# Patient Record
Sex: FEMALE | Race: WHITE | NOT HISPANIC OR LATINO | Employment: FULL TIME | URBAN - METROPOLITAN AREA
[De-identification: names, ages, dates, MRNs, and addresses within clinical notes are randomized per-mention and may not be internally consistent; named-entity substitution may affect disease eponyms.]

---

## 2022-03-07 ENCOUNTER — TELEPHONE (OUTPATIENT)
Dept: NEUROLOGY | Facility: CLINIC | Age: 52
End: 2022-03-07
Payer: COMMERCIAL

## 2022-03-07 NOTE — TELEPHONE ENCOUNTER
Contacted patient in regards to scheduling appt, pt was scheduled with  for 9/8/22. Has also been added to the waiting list.

## 2022-03-07 NOTE — TELEPHONE ENCOUNTER
----- Message from Re Bearden sent at 3/7/2022  3:54 PM CST -----  Contact: Kerrie  Type:  Sooner Apoointment Request    Caller is requesting a sooner appointment.  Caller declined first available appointment listed below.  Caller will not accept being placed on the waitlist and is requesting a message be sent to doctor.  Name of Caller: Kerrie  When is the first available appointment?6/2022  Symptoms: Headaches for a year and a half  Would the patient rather a call back or a response via [facility]? Call back  Best Call Back Number: [phone].  Additional Information:

## 2023-11-27 ENCOUNTER — TELEPHONE (OUTPATIENT)
Dept: PRIMARY CARE CLINIC | Facility: CLINIC | Age: 53
End: 2023-11-27
Payer: COMMERCIAL

## 2023-11-27 NOTE — TELEPHONE ENCOUNTER
----- Message from [place]ie [place] sent at 11/27/2023  2:34 PM CST -----  Contact: Kerrie  .Type:  Patient Returning Call    Who Called: Kerrie   Who Left Message for Patient:Dede Ramirez MA  Does the patient know what this is regarding?:  Would the patient rather a call back or a response via [facility]? call  Best Call Back Number:  [phone]  Additional Information: kerrie is returning the missed call from today and would like to speak to you today please.

## 2023-11-27 NOTE — TELEPHONE ENCOUNTER
Patient is aware that you are not accepting new patients. Turned down request for Dr. Elmore and states that she would like to see a female provider. Patient is wondering if you had any recommendations.

## 2023-11-27 NOTE — TELEPHONE ENCOUNTER
----- Message from Salvador Granados sent at 11/27/2023 12:01 PM CST -----  Type:  Patient Returning Call    Who Called:pt  Who Left Message for Patient:  Does the patient know what this is regarding?:appt- est care  Would the patient rather a call back or a response via [facility]? Call  Best Call Back Number:[phone]  Additional Information: pt states she is calling in regards to trying to est care with provider if possible. Thank you

## 2023-11-30 ENCOUNTER — OFFICE VISIT (OUTPATIENT)
Dept: INTERNAL MEDICINE | Facility: CLINIC | Age: 53
End: 2023-11-30
Payer: COMMERCIAL

## 2023-11-30 VITALS
SYSTOLIC BLOOD PRESSURE: 124 MMHG | HEART RATE: 88 BPM | TEMPERATURE: 97 F | OXYGEN SATURATION: 98 % | DIASTOLIC BLOOD PRESSURE: 86 MMHG | BODY MASS INDEX: 27.55 KG/M2 | HEIGHT: 64 IN | WEIGHT: 161.38 LBS

## 2023-11-30 DIAGNOSIS — R23.2 HOT FLASHES: Primary | ICD-10-CM

## 2023-11-30 PROCEDURE — 99203 OFFICE O/P NEW LOW 30 MIN: CPT | Mod: S$GLB,,, | Performed by: NURSE PRACTITIONER

## 2023-11-30 PROCEDURE — 1159F MED LIST DOCD IN RCRD: CPT | Mod: CPTII,S$GLB,, | Performed by: NURSE PRACTITIONER

## 2023-11-30 PROCEDURE — 99999 PR PBB SHADOW E&M-EST. PATIENT-LVL IV: ICD-10-PCS | Mod: PBBFAC,,, | Performed by: NURSE PRACTITIONER

## 2023-11-30 PROCEDURE — 99203 PR OFFICE/OUTPT VISIT, NEW, LEVL III, 30-44 MIN: ICD-10-PCS | Mod: S$GLB,,, | Performed by: NURSE PRACTITIONER

## 2023-11-30 PROCEDURE — 3074F SYST BP LT 130 MM HG: CPT | Mod: CPTII,S$GLB,, | Performed by: NURSE PRACTITIONER

## 2023-11-30 PROCEDURE — 3079F DIAST BP 80-89 MM HG: CPT | Mod: CPTII,S$GLB,, | Performed by: NURSE PRACTITIONER

## 2023-11-30 PROCEDURE — 1159F PR MEDICATION LIST DOCUMENTED IN MEDICAL RECORD: ICD-10-PCS | Mod: CPTII,S$GLB,, | Performed by: NURSE PRACTITIONER

## 2023-11-30 PROCEDURE — 99999 PR PBB SHADOW E&M-EST. PATIENT-LVL IV: CPT | Mod: PBBFAC,,, | Performed by: NURSE PRACTITIONER

## 2023-11-30 PROCEDURE — 3079F PR MOST RECENT DIASTOLIC BLOOD PRESSURE 80-89 MM HG: ICD-10-PCS | Mod: CPTII,S$GLB,, | Performed by: NURSE PRACTITIONER

## 2023-11-30 PROCEDURE — 3008F PR BODY MASS INDEX (BMI) DOCUMENTED: ICD-10-PCS | Mod: CPTII,S$GLB,, | Performed by: NURSE PRACTITIONER

## 2023-11-30 PROCEDURE — 3008F BODY MASS INDEX DOCD: CPT | Mod: CPTII,S$GLB,, | Performed by: NURSE PRACTITIONER

## 2023-11-30 PROCEDURE — 3074F PR MOST RECENT SYSTOLIC BLOOD PRESSURE < 130 MM HG: ICD-10-PCS | Mod: CPTII,S$GLB,, | Performed by: NURSE PRACTITIONER

## 2023-11-30 RX ORDER — CLONIDINE HYDROCHLORIDE 0.1 MG/1
0.1 TABLET ORAL NIGHTLY
Qty: 30 TABLET | Refills: 1 | Status: SHIPPED | OUTPATIENT
Start: 2023-11-30 | End: 2024-11-29

## 2023-11-30 RX ORDER — MULTIVIT,MIN/FOLIC ACID/HRB157 400 MCG
TABLET ORAL
COMMUNITY

## 2023-11-30 NOTE — PROGRESS NOTES
Subjective:       Patient ID: Kerrie Fernandes is a 53 y.o. female.    Chief Complaint: Hot Flashes (February/Night sweats)    HPI    Pt here due to menopause/hot flashes  Reports she tried HRT with GYN ineffective  Wants to try another med    Past Medical History:   Diagnosis Date    Abdominal pain, generalized 2021 10:33:13 AM    found to be gastro.-- dilated trans colon removed seeds etc and sx resolved.    Anxiety state 3/15/2017 9:26:41 AM    COVID     Endometriosis of other specified sites 2021 10:23:18 AM    TX W LUPRON PRIOR TO PREG.    Fibroids     small 1 1/2 cm    Major depressive disorder, single episode, mild 3/15/2017 9:26:35 AM     Past Surgical History:   Procedure Laterality Date          COLONOSCOPY      HYSTEROSCOPY          pelivc adhensions          primary            Social History     Socioeconomic History    Marital status:    Tobacco Use    Smoking status: Never    Smokeless tobacco: Never   Substance and Sexual Activity    Alcohol use: Yes     Comment: occ    Drug use: Never    Sexual activity: Yes     Partners: Male     Birth control/protection: Post-menopausal     Review of patient's allergies indicates:  No Known Allergies  Current Outpatient Medications   Medication Sig    atorvastatin (LIPITOR) 10 MG tablet     buPROPion (WELLBUTRIN XL) 150 MG TB24 tablet     esomeprazole (NEXIUM) 40 MG capsule Take 40 mg by mouth.    famotidine (PEPCID) 40 MG tablet     FLUoxetine 40 MG capsule Take 40 mg by mouth once daily.    multivitamin (THERAGRAN) per tablet Take 1 tablet by mouth once daily.    mv,Ca,min-FA-herbal no.157 (ESTROVEN MAXIMUM STRENGTH) 400 mcg Tab Take by mouth.    pantoprazole (PROTONIX) 40 MG tablet Take 40 mg by mouth once daily.     No current facility-administered medications for this visit.           Review of Systems   Constitutional:  Negative for activity change, appetite change, chills, diaphoresis,  fatigue, fever and unexpected weight change.   HENT:  Negative for congestion, ear pain, postnasal drip, rhinorrhea, sinus pressure, sinus pain, sneezing, sore throat, tinnitus, trouble swallowing and voice change.    Eyes:  Negative for photophobia, pain and visual disturbance.   Respiratory:  Negative for cough, chest tightness, shortness of breath and wheezing.    Cardiovascular:  Negative for chest pain, palpitations and leg swelling.   Gastrointestinal:  Negative for abdominal distention, abdominal pain, constipation, diarrhea, nausea and vomiting.   Endocrine:        See hpi   Genitourinary:  Negative for decreased urine volume, difficulty urinating, dysuria, flank pain, frequency, hematuria and urgency.   Musculoskeletal:  Negative for arthralgias, back pain, joint swelling, neck pain and neck stiffness.   Allergic/Immunologic: Negative for immunocompromised state.   Neurological:  Negative for dizziness, tremors, seizures, syncope, facial asymmetry, speech difficulty, weakness, light-headedness, numbness and headaches.   Hematological:  Negative for adenopathy. Does not bruise/bleed easily.   Psychiatric/Behavioral:  Negative for confusion and sleep disturbance.        Objective:      Physical Exam  Vitals reviewed.   Neurological:      Mental Status: She is alert and oriented to person, place, and time.       Assessment:     Vitals:    11/30/23 1449   BP: 124/86   Pulse: 88   Temp: 96.8 °F (36 °C)         No diagnosis found.    Plan:   There are no diagnoses linked to this encounter.    We discussed clonidine and gabapentin as off label meds that can be utilized for hot flashes. SE discussed. She would like to try gabapentin. Follow up to [facility] care and reevaulate meds